# Patient Record
(demographics unavailable — no encounter records)

---

## 2025-07-14 NOTE — CONSULT LETTER
[Consult Letter:] : I had the pleasure of evaluating your patient, [unfilled]. [Please see my note below.] : Please see my note below. [Consult Closing:] : Thank you very much for allowing me to participate in the care of this patient.  If you have any questions, please do not hesitate to contact me. [Sincerely,] : Sincerely, [FreeTextEntry3] : Pavithra Siegel MD MS Pediatric Nephrology [facility]  [phone]

## 2025-07-14 NOTE — CONSULT LETTER
[Consult Letter:] : I had the pleasure of evaluating your patient, [unfilled]. [Please see my note below.] : Please see my note below. [Consult Closing:] : Thank you very much for allowing me to participate in the care of this patient.  If you have any questions, please do not hesitate to contact me. [Sincerely,] : Sincerely, [FreeTextEntry3] : Pavithra Siegel MD MS Pediatric Nephrology [facility]  [phone]

## 2025-07-14 NOTE — PHYSICAL EXAM
[Well Developed] : well developed [Normal] : soft; non- distended; non-tender; no hepatosplenomegaly or masses [de-identified] : normocephalic atraumatic no conjunctival injection intact extraocular movements, sclera not icteric moist mucous membranes  [de-identified] : minimal distension with some fluid nontender soft [de-identified] : no appreciable edema of extremities [de-identified] : No edema noted

## 2025-07-14 NOTE — END OF VISIT
[] : Fellow [FreeTextEntry3] :  Tisha Brownlee MD, PGY 6 Pavithra Siegel MD MS Pediatric Nephrology

## 2025-07-14 NOTE — REASON FOR VISIT
[Nephrotic Syndrome] : nephrotic syndrome [Parents] : parents [Medical Records] : medical records [Follow-Up] : a follow-up visit for [Interpreters_IDNumber] : [id number]

## 2025-07-14 NOTE — CONSULT LETTER
[Consult Letter:] : I had the pleasure of evaluating your patient, [unfilled]. [Please see my note below.] : Please see my note below. [Consult Closing:] : Thank you very much for allowing me to participate in the care of this patient.  If you have any questions, please do not hesitate to contact me. [Sincerely,] : Sincerely, [FreeTextEntry3] : Pavithra Siegel MD MS Pediatric Nephrology [facility]  [phone]

## 2025-07-14 NOTE — REASON FOR VISIT
[Nephrotic Syndrome] : nephrotic syndrome [Parents] : parents [Medical Records] : medical records [Follow-Up] : a follow-up visit for [Interpreters_IDNumber] : [id number]

## 2025-07-14 NOTE — PHYSICAL EXAM
[Well Developed] : well developed [Normal] : soft; non- distended; non-tender; no hepatosplenomegaly or masses [de-identified] : normocephalic atraumatic no conjunctival injection intact extraocular movements, sclera not icteric moist mucous membranes  [de-identified] : minimal distension with some fluid nontender soft [de-identified] : no appreciable edema of extremities [de-identified] : No edema noted

## 2025-07-14 NOTE — PHYSICAL EXAM
[Well Developed] : well developed [Normal] : soft; non- distended; non-tender; no hepatosplenomegaly or masses [de-identified] : normocephalic atraumatic no conjunctival injection intact extraocular movements, sclera not icteric moist mucous membranes  [de-identified] : minimal distension with some fluid nontender soft [de-identified] : no appreciable edema of extremities [de-identified] : No edema noted

## 2025-07-14 NOTE — REASON FOR VISIT
[Nephrotic Syndrome] : nephrotic syndrome [Parents] : parents [Medical Records] : medical records [Follow-Up] : a follow-up visit for [Interpreters_IDNumber] : [id number]

## 2025-07-17 NOTE — REASON FOR VISIT
[Follow-Up] : a follow-up visit for [Mother] : mother [Medical Records] : medical records [FreeTextEntry3] : Nephrotic Syndrome

## 2025-07-17 NOTE — PHYSICAL EXAM
[Normal] : regular rate and variability; normal S1 and S2; no murmur [de-identified] : normocephalic atraumatic mild periorbital edema no conjunctival injection intact extraocular movements, sclera not icteric moist mucous membranes  [de-identified] : minimal extremity edema [de-identified] : awake alert oriented.

## 2025-07-17 NOTE — PHYSICAL EXAM
[Normal] : regular rate and variability; normal S1 and S2; no murmur [de-identified] : normocephalic atraumatic mild periorbital edema no conjunctival injection intact extraocular movements, sclera not icteric moist mucous membranes  [de-identified] : minimal extremity edema [de-identified] : awake alert oriented.

## 2025-07-17 NOTE — PHYSICAL EXAM
[Normal] : regular rate and variability; normal S1 and S2; no murmur [de-identified] : normocephalic atraumatic mild periorbital edema no conjunctival injection intact extraocular movements, sclera not icteric moist mucous membranes  [de-identified] : minimal extremity edema [de-identified] : awake alert oriented.

## 2025-07-18 NOTE — PHYSICAL EXAM
[Well Developed] : well developed [Normal] : normal external genitalia, no rash [de-identified] : minimal distension with some fluid nontender soft [de-identified] : No edema noted [de-identified] : normocephalic atraumatic no conjunctival injection intact extraocular movements, sclera not icteric moist mucous membranes  [de-identified] : no appreciable edema of extremities [de-identified] : No edema noted

## 2025-07-18 NOTE — PHYSICAL EXAM
[Well Developed] : well developed [Normal] : normal external genitalia, no rash [de-identified] : minimal distension with some fluid nontender soft [de-identified] : No edema noted [de-identified] : normocephalic atraumatic no conjunctival injection intact extraocular movements, sclera not icteric moist mucous membranes  [de-identified] : no appreciable edema of extremities [de-identified] : No edema noted

## 2025-07-18 NOTE — END OF VISIT
[] : Fellow [FreeTextEntry3] :  Tisha Brownlee MD, PGY 5 Teena Best MD Pediatric Nephrology  [Time Spent: ___ minutes] : I have spent [unfilled] minutes of time on the encounter which excludes teaching and separately reported services.

## 2025-07-18 NOTE — REASON FOR VISIT
[Follow-Up] : a follow-up visit for [Nephrotic Syndrome] : nephrotic syndrome [Parents] : parents [Medical Records] : medical records [Language [employer] ] : provided by [employer]   [Interpreters_IDNumber] : [id number]

## 2025-07-18 NOTE — REASON FOR VISIT
[Follow-Up] : a follow-up visit for [Nephrotic Syndrome] : nephrotic syndrome [Parents] : parents [Medical Records] : medical records [Language [employer] ] : provided by [employer]   [Interpreters_IDNumber] : [id number]

## 2025-07-29 NOTE — REASON FOR VISIT
[Consultation] : a consultation visit [Medical Records] : medical records [Language Line ] : provided by [employer]   [Interpreters_IDNumber] : [id number] [Interpreters_FullName] : Jessica [TWNoteComboBox1] : Barbadian

## 2025-07-29 NOTE — REASON FOR VISIT
[Consultation] : a consultation visit [Medical Records] : medical records [Language Line ] : provided by [employer]   [Interpreters_IDNumber] : [id number] [Interpreters_FullName] : Jessica [TWNoteComboBox1] : Angolan

## 2025-07-29 NOTE — HISTORY OF PRESENT ILLNESS
[Constipation] : constipation [Cold Intolerance] : no cold intolerance [Fatigue] : no fatigue [Abdominal Pain] : no abdominal pain [FreeTextEntry2] : Praful is a 2-year and 4-month-old male with no significant past medical history presenting with periorbital and lower extremity edema for the past three months. He was diagnosed with nephrotic syndrome and subsequently found to have abnormal thyroid function tests (TFTs). Endocrinology was consulted on July 2, 2025, in the emergency department at [facility] [place] due to these abnormal TFTs. Initial results showed a thyroid-stimulating hormone (TSH) level of 28 uIU/mL, total thyroxine (T4) of 3.15 ug/dL, free T4 (FT4) of 0.6 ng/dL, and thyroxine-binding globulin (TBG) of 10 ug/mL. These findings, elevated TSH with low free and total T4, are consistent with hypothyroidism. Given his nephrotic syndrome with significant protein loss, suggested by hypoalbuminemia and proteinuria, there was low suspicion for congenital hypothyroidism, especially as his growth and development had been normal. Levothyroxine therapy was recommended. Repeat TFTs on July 8, 2025, showed a TSH of 28.3 uIU/mL, T4 of 5.0 ug/dL, and FT4 of 1.12 ng/dL. He had not yet started medication at that time. 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 He was prescribed levothyroxine 37.5 mcg, which he started approximately two weeks ago. His mother administers the tablet mixed with juice or water in the morning before breakfast. He strains during bowel movements, but his stools are not hard. He also experiences excessive sweating but he is also very active according to the mother.

## 2025-07-29 NOTE — ASSESSMENT
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 He was started on levothyroxine 37.5mcg daily 2 weeks ago. He takes it without missed doses. Denies any symptoms of hypothyroidism at this time. Today we will repeat TFTs to check for adequacy of his dose. Will call with results. Hypothyroidism due to nephrotic syndrome most commonly results from urinary loss of thyroid hormone-binding proteins and, in severe cases, direct loss of thyroid hormones, leading to decreased circulating thyroid hormone levels and elevated TSH. This acquired hypothyroidism is more likely in children with significant proteinuria and hypoalbuminemia, and can impact growth and neurodevelopment if untreated. Levothyroxine is the treatment of choice and should be adjusted based on serial thyroid function tests to maintain euthyroid state. Close monitoring is essential, as thyroid hormone requirements may fluctuate with changes in proteinuria and albumin levels, and hypothyroidism may resolve if nephrotic syndrome remits.  follow up 9/22/25 with Dr Hernandes at 1120am, labs to be done before the visit.  Aditi Cardenas MD Pediatric Endocrinology Fellow, PGY6

## 2025-07-29 NOTE — CONSULT LETTER
[Dear  ___] : Dear  [unfilled], [Consult Letter:] : I had the pleasure of evaluating your patient, [unfilled]. [Please see my note below.] : Please see my note below. [Consult Closing:] : Thank you very much for allowing me to participate in the care of this patient.  If you have any questions, please do not hesitate to contact me. [Sincerely,] : Sincerely, [FreeTextEntry3] : Danilo Hernandes, DO

## 2025-07-29 NOTE — PHYSICAL EXAM
[Healthy Appearing] : healthy appearing [Well Nourished] : well nourished [Interactive] : interactive [Normal Appearance] : normal appearance [Well formed] : well formed [Normally Set] : normally set [Normal S1 and S2] : normal S1 and S2 [Clear to Ausculation Bilaterally] : clear to auscultation bilaterally [Abdomen Soft] : soft [Abdomen Tenderness] : non-tender [] : no hepatosplenomegaly [Normal] : normal  [Obese] : obese [1] : was Brett stage 1 [___] : [unfilled] [Murmur] : no murmurs

## 2025-07-29 NOTE — HISTORY OF PRESENT ILLNESS
[Constipation] : constipation [Cold Intolerance] : no cold intolerance [Fatigue] : no fatigue [Abdominal Pain] : no abdominal pain [FreeTextEntry2] : Praful is a 2-year and 4-month-old male with no significant past medical history presenting with periorbital and lower extremity edema for the past three months. He was diagnosed with nephrotic syndrome and subsequently found to have abnormal thyroid function tests (TFTs). [place]ocrinology [place] on July 2, 2025, [place] [facility] [place]s. Initial results showed a thyroid-stimulating hormone (TSH) level of 28 uIU/mL, total thyroxine (T4) of 3.15 ug/dL, free T4 (FT4) of 0.6 ng/dL, and thyroxine-binding globulin (TBG) of 10 ug/mL. These findings, elevated TSH with low free and total T4, are consistent with hypothyroidism. Given his nephrotic syndrome with significant protein loss, suggested by hypoalbuminemia and proteinuria, there was low suspicion for congenital hypothyroidism, especially as his growth and development had been normal. Levothyroxine therapy was recommended. Repeat TFTs on July 8, 2025, showed a TSH of 28.3 uIU/mL, T4 of 5.0 ug/dL, and FT4 of 1.12 ng/dL. He had not yet started medication at that time. 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 He was prescribed levothyroxine 37.5 mcg, which he started approximately two weeks ago. His mother administers the tablet mixed with juice or water in the morning before breakfast. He strains during bowel movements, but his stools are not hard. He also experiences excessive sweating but he is also very active according to the mother.